# Patient Record
Sex: MALE | Race: BLACK OR AFRICAN AMERICAN
[De-identification: names, ages, dates, MRNs, and addresses within clinical notes are randomized per-mention and may not be internally consistent; named-entity substitution may affect disease eponyms.]

---

## 2019-08-11 ENCOUNTER — HOSPITAL ENCOUNTER (EMERGENCY)
Dept: HOSPITAL 15 - ER | Age: 29
LOS: 1 days | Discharge: HOME | End: 2019-08-12
Payer: MEDICAID

## 2019-08-11 VITALS — BODY MASS INDEX: 29.49 KG/M2 | HEIGHT: 70 IN | WEIGHT: 206 LBS

## 2019-08-11 DIAGNOSIS — S61.411A: Primary | ICD-10-CM
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
## 2019-08-11 DIAGNOSIS — K21.9: ICD-10-CM

## 2019-08-11 DIAGNOSIS — S71.132A: ICD-10-CM
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
## 2019-08-11 PROCEDURE — 96372 THER/PROPH/DIAG INJ SC/IM: CPT

## 2019-08-11 PROCEDURE — 73130 X-RAY EXAM OF HAND: CPT

## 2019-08-11 PROCEDURE — 90715 TDAP VACCINE 7 YRS/> IM: CPT

## 2019-08-11 PROCEDURE — 99284 EMERGENCY DEPT VISIT MOD MDM: CPT

## 2019-08-11 PROCEDURE — 12002 RPR S/N/AX/GEN/TRNK2.6-7.5CM: CPT

## 2019-08-11 PROCEDURE — 90471 IMMUNIZATION ADMIN: CPT

## 2019-08-12 VITALS — SYSTOLIC BLOOD PRESSURE: 135 MMHG | DIASTOLIC BLOOD PRESSURE: 97 MMHG

## 2019-08-18 ENCOUNTER — HOSPITAL ENCOUNTER (EMERGENCY)
Dept: HOSPITAL 15 - ER | Age: 29
Discharge: HOME | End: 2019-08-18
Payer: MEDICAID

## 2019-08-18 VITALS — SYSTOLIC BLOOD PRESSURE: 139 MMHG | DIASTOLIC BLOOD PRESSURE: 82 MMHG

## 2019-08-18 VITALS — HEIGHT: 71 IN | WEIGHT: 207 LBS | BODY MASS INDEX: 28.98 KG/M2
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
## 2019-08-18 DIAGNOSIS — S61.411D: Primary | ICD-10-CM

## 2019-08-18 DIAGNOSIS — K21.9: ICD-10-CM

## 2019-09-02 ENCOUNTER — HOSPITAL ENCOUNTER (INPATIENT)
Dept: HOSPITAL 15 - ER | Age: 29
LOS: 3 days | Discharge: HOME | DRG: 248 | End: 2019-09-05
Attending: INTERNAL MEDICINE | Admitting: NURSE PRACTITIONER
Payer: MEDICAID

## 2019-09-02 VITALS — WEIGHT: 204.37 LBS | BODY MASS INDEX: 28.61 KG/M2 | HEIGHT: 71 IN

## 2019-09-02 DIAGNOSIS — K21.9: ICD-10-CM

## 2019-09-02 DIAGNOSIS — M54.5: ICD-10-CM

## 2019-09-02 DIAGNOSIS — A04.72: Primary | ICD-10-CM

## 2019-09-02 DIAGNOSIS — R50.9: ICD-10-CM

## 2019-09-02 PROCEDURE — 80053 COMPREHEN METABOLIC PANEL: CPT

## 2019-09-02 PROCEDURE — 87493 C DIFF AMPLIFIED PROBE: CPT

## 2019-09-02 PROCEDURE — 87045 FECES CULTURE AEROBIC BACT: CPT

## 2019-09-02 PROCEDURE — 83615 LACTATE (LD) (LDH) ENZYME: CPT

## 2019-09-02 PROCEDURE — 81001 URINALYSIS AUTO W/SCOPE: CPT

## 2019-09-02 PROCEDURE — 74176 CT ABD & PELVIS W/O CONTRAST: CPT

## 2019-09-02 PROCEDURE — 85007 BL SMEAR W/DIFF WBC COUNT: CPT

## 2019-09-02 PROCEDURE — 83690 ASSAY OF LIPASE: CPT

## 2019-09-02 PROCEDURE — 84484 ASSAY OF TROPONIN QUANT: CPT

## 2019-09-02 PROCEDURE — 36415 COLL VENOUS BLD VENIPUNCTURE: CPT

## 2019-09-02 PROCEDURE — 82150 ASSAY OF AMYLASE: CPT

## 2019-09-02 PROCEDURE — 83605 ASSAY OF LACTIC ACID: CPT

## 2019-09-02 PROCEDURE — 85027 COMPLETE CBC AUTOMATED: CPT

## 2019-09-02 PROCEDURE — 87899 AGENT NOS ASSAY W/OPTIC: CPT

## 2019-09-02 PROCEDURE — 80048 BASIC METABOLIC PNL TOTAL CA: CPT

## 2019-09-03 VITALS — DIASTOLIC BLOOD PRESSURE: 78 MMHG | SYSTOLIC BLOOD PRESSURE: 124 MMHG

## 2019-09-03 VITALS — DIASTOLIC BLOOD PRESSURE: 88 MMHG | SYSTOLIC BLOOD PRESSURE: 128 MMHG

## 2019-09-03 VITALS — SYSTOLIC BLOOD PRESSURE: 128 MMHG | DIASTOLIC BLOOD PRESSURE: 88 MMHG

## 2019-09-03 VITALS — DIASTOLIC BLOOD PRESSURE: 78 MMHG | SYSTOLIC BLOOD PRESSURE: 128 MMHG

## 2019-09-03 VITALS — SYSTOLIC BLOOD PRESSURE: 126 MMHG | DIASTOLIC BLOOD PRESSURE: 67 MMHG

## 2019-09-03 LAB
ALBUMIN SERPL-MCNC: 3.6 G/DL (ref 3.4–5)
ALP SERPL-CCNC: 81 U/L (ref 45–117)
ALT SERPL-CCNC: 19 U/L (ref 16–61)
AMYLASE SERPL-CCNC: 41 U/L (ref 25–115)
ANION GAP SERPL CALCULATED.3IONS-SCNC: 8 MMOL/L (ref 5–15)
BILIRUB SERPL-MCNC: 0.3 MG/DL (ref 0.2–1)
BUN SERPL-MCNC: 9 MG/DL (ref 7–18)
BUN/CREAT SERPL: 8.3
CALCIUM SERPL-MCNC: 9 MG/DL (ref 8.5–10.1)
CHLORIDE SERPL-SCNC: 101 MMOL/L (ref 98–107)
CO2 SERPL-SCNC: 29 MMOL/L (ref 21–32)
GLUCOSE SERPL-MCNC: 105 MG/DL (ref 74–106)
HCT VFR BLD AUTO: 48.6 % (ref 41–53)
HGB BLD-MCNC: 16.9 G/DL (ref 13.5–17.5)
LIPASE SERPL-CCNC: 85 U/L (ref 73–393)
MCH RBC QN AUTO: 30.5 PG (ref 28–32)
MCV RBC AUTO: 87.8 FL (ref 80–100)
POTASSIUM SERPL-SCNC: 4 MMOL/L (ref 3.5–5.1)
PROT SERPL-MCNC: 8.1 G/DL (ref 6.4–8.2)
SODIUM SERPL-SCNC: 138 MMOL/L (ref 136–145)

## 2019-09-03 RX ADMIN — FAMOTIDINE SCH MG: 20 TABLET, FILM COATED ORAL at 10:11

## 2019-09-03 RX ADMIN — HYDROCODONE BITARTRATE AND ACETAMINOPHEN PRN TAB: 5; 325 TABLET ORAL at 08:38

## 2019-09-03 RX ADMIN — HYDROCODONE BITARTRATE AND ACETAMINOPHEN PRN TAB: 5; 325 TABLET ORAL at 15:34

## 2019-09-03 RX ADMIN — FAMOTIDINE SCH MG: 20 TABLET, FILM COATED ORAL at 21:10

## 2019-09-03 NOTE — NUR
MS admit from ER

MARQUIS GUTIERREZ admitted to tele/MS after SBAR received.  Patient oriented to TONE MORA, primary RN, unit, room, bed, and unit policies regarding patient care and 
visiting hours. Patient weighed by bed scale and encouraged to call if they need something. 
All questions and concerns addressed, patient verbalized understanding.

## 2019-09-04 VITALS — SYSTOLIC BLOOD PRESSURE: 117 MMHG | DIASTOLIC BLOOD PRESSURE: 78 MMHG

## 2019-09-04 VITALS — DIASTOLIC BLOOD PRESSURE: 90 MMHG | SYSTOLIC BLOOD PRESSURE: 133 MMHG

## 2019-09-04 VITALS — SYSTOLIC BLOOD PRESSURE: 126 MMHG | DIASTOLIC BLOOD PRESSURE: 84 MMHG

## 2019-09-04 VITALS — DIASTOLIC BLOOD PRESSURE: 98 MMHG | SYSTOLIC BLOOD PRESSURE: 143 MMHG

## 2019-09-04 VITALS — DIASTOLIC BLOOD PRESSURE: 82 MMHG | SYSTOLIC BLOOD PRESSURE: 136 MMHG

## 2019-09-04 LAB
ANION GAP SERPL CALCULATED.3IONS-SCNC: 7 MMOL/L (ref 5–15)
BUN SERPL-MCNC: 5 MG/DL (ref 7–18)
BUN/CREAT SERPL: 5.4
CALCIUM SERPL-MCNC: 8.7 MG/DL (ref 8.5–10.1)
CHLORIDE SERPL-SCNC: 103 MMOL/L (ref 98–107)
CO2 SERPL-SCNC: 28 MMOL/L (ref 21–32)
GLUCOSE SERPL-MCNC: 96 MG/DL (ref 74–106)
HCT VFR BLD AUTO: 45.6 % (ref 41–53)
HGB BLD-MCNC: 15.7 G/DL (ref 13.5–17.5)
MCH RBC QN AUTO: 30.3 PG (ref 28–32)
MCV RBC AUTO: 87.7 FL (ref 80–100)
POTASSIUM SERPL-SCNC: 3.7 MMOL/L (ref 3.5–5.1)
SODIUM SERPL-SCNC: 138 MMOL/L (ref 136–145)

## 2019-09-04 RX ADMIN — VANCOMYCIN HYDROCHLORIDE SCH MG: 1 INJECTION, POWDER, LYOPHILIZED, FOR SOLUTION INTRAVENOUS at 21:30

## 2019-09-04 RX ADMIN — HYDROCODONE BITARTRATE AND ACETAMINOPHEN PRN TAB: 5; 325 TABLET ORAL at 21:54

## 2019-09-04 RX ADMIN — FAMOTIDINE SCH MG: 20 TABLET, FILM COATED ORAL at 09:13

## 2019-09-04 RX ADMIN — VANCOMYCIN HYDROCHLORIDE SCH MG: 1 INJECTION, POWDER, LYOPHILIZED, FOR SOLUTION INTRAVENOUS at 17:51

## 2019-09-04 RX ADMIN — HYDROCODONE BITARTRATE AND ACETAMINOPHEN PRN TAB: 5; 325 TABLET ORAL at 12:03

## 2019-09-04 RX ADMIN — HYDROCODONE BITARTRATE AND ACETAMINOPHEN PRN TAB: 5; 325 TABLET ORAL at 01:17

## 2019-09-04 NOTE — NUR
Dr. Reynaga at bedside discuss with patient and made aware of c-diff positive, received new 
orders, noted and carried it out.

## 2019-09-05 VITALS — DIASTOLIC BLOOD PRESSURE: 71 MMHG | SYSTOLIC BLOOD PRESSURE: 117 MMHG

## 2019-09-05 VITALS — SYSTOLIC BLOOD PRESSURE: 134 MMHG | DIASTOLIC BLOOD PRESSURE: 92 MMHG

## 2019-09-05 VITALS — DIASTOLIC BLOOD PRESSURE: 61 MMHG | SYSTOLIC BLOOD PRESSURE: 127 MMHG

## 2019-09-05 RX ADMIN — HYDROCODONE BITARTRATE AND ACETAMINOPHEN PRN TAB: 5; 325 TABLET ORAL at 03:18

## 2019-09-05 RX ADMIN — HYDROCODONE BITARTRATE AND ACETAMINOPHEN PRN TAB: 5; 325 TABLET ORAL at 07:29

## 2019-09-05 RX ADMIN — VANCOMYCIN HYDROCHLORIDE SCH MG: 1 INJECTION, POWDER, LYOPHILIZED, FOR SOLUTION INTRAVENOUS at 11:47

## 2019-09-05 RX ADMIN — VANCOMYCIN HYDROCHLORIDE SCH MG: 1 INJECTION, POWDER, LYOPHILIZED, FOR SOLUTION INTRAVENOUS at 05:17

## 2019-09-05 NOTE — NUR
Opening Shift Note

Assumed care of patient, awake and alert, oriented x4 and verbally responsive.  Respiratory 
even and unlabored. No S/S of distress/SOB or pain.  Skin is warm and dry to touch. 
Instructed on POC and to call for assist PRN, will continue to monitor for changes Q1hr and 
PRN.

## 2019-09-05 NOTE — NUR
Discharge instructions given as ordered. Encourage to follow up with PMD (Follow up with PCP 
in 1-2 weeks Dr. maribel Leong

on Monday Sep 9 at 1.15 PM #[phone] Address : [address] [place] [id number]. Follow up 
with Dr. SAWYER () in 1-2 weeks Address : [id number] [place] [address] Rd, [place], [address] #[phone] 
Ext : [phone]) as instructed. All questions and concerns addressed. Patient verbalized 
understanding.  Medication reconciliation form completed and copy given to patient. IV 
removed with catheter intact, pressure dressing applied.  Patient taken to vehicle via 
wheelchair with all personal belongings, accompanied by staff and family member. No distress 
noted at time of departure.

## 2022-04-29 ENCOUNTER — HOSPITAL ENCOUNTER (EMERGENCY)
Dept: HOSPITAL 15 - ER | Age: 32
Discharge: HOME | End: 2022-04-29
Payer: MEDICAID

## 2022-04-29 VITALS — HEIGHT: 72 IN | BODY MASS INDEX: 29.12 KG/M2 | WEIGHT: 215 LBS

## 2022-04-29 VITALS — SYSTOLIC BLOOD PRESSURE: 139 MMHG | DIASTOLIC BLOOD PRESSURE: 93 MMHG

## 2022-04-29 DIAGNOSIS — Z79.899: ICD-10-CM

## 2022-04-29 DIAGNOSIS — K21.9: ICD-10-CM

## 2022-04-29 DIAGNOSIS — K60.2: Primary | ICD-10-CM

## 2022-04-29 DIAGNOSIS — Z79.2: ICD-10-CM

## 2022-05-10 ENCOUNTER — HOSPITAL ENCOUNTER (EMERGENCY)
Dept: HOSPITAL 15 - ER | Age: 32
LOS: 1 days | Discharge: HOME | End: 2022-05-11
Payer: MEDICAID

## 2022-05-10 VITALS — WEIGHT: 204 LBS | HEIGHT: 72 IN | BODY MASS INDEX: 27.63 KG/M2

## 2022-05-10 DIAGNOSIS — E86.0: ICD-10-CM

## 2022-05-10 DIAGNOSIS — R19.7: ICD-10-CM

## 2022-05-10 DIAGNOSIS — R07.89: Primary | ICD-10-CM

## 2022-05-10 DIAGNOSIS — K21.9: ICD-10-CM

## 2022-05-10 LAB
ALBUMIN SERPL-MCNC: 2.7 G/DL (ref 3.4–5)
ALP SERPL-CCNC: 69 U/L (ref 45–117)
ALT SERPL-CCNC: 15 U/L (ref 16–61)
ANION GAP SERPL CALCULATED.3IONS-SCNC: 11 MMOL/L (ref 5–15)
BILIRUB SERPL-MCNC: 0.4 MG/DL (ref 0.2–1)
BUN SERPL-MCNC: 12 MG/DL (ref 7–18)
BUN/CREAT SERPL: 12.4
CALCIUM SERPL-MCNC: 8.3 MG/DL (ref 8.5–10.1)
CHLORIDE SERPL-SCNC: 93 MMOL/L (ref 98–107)
CO2 SERPL-SCNC: 28 MMOL/L (ref 21–32)
GLUCOSE SERPL-MCNC: 116 MG/DL (ref 74–106)
HCT VFR BLD AUTO: 43.2 % (ref 41–53)
HGB BLD-MCNC: 15.3 G/DL (ref 13.5–17.5)
MCH RBC QN AUTO: 30.3 PG (ref 28–32)
MCV RBC AUTO: 85.5 FL (ref 80–100)
POTASSIUM SERPL-SCNC: 4.1 MMOL/L (ref 3.5–5.1)
PROT SERPL-MCNC: 6.6 G/DL (ref 6.4–8.2)
SODIUM SERPL-SCNC: 132 MMOL/L (ref 136–145)

## 2022-05-10 PROCEDURE — 85007 BL SMEAR W/DIFF WBC COUNT: CPT

## 2022-05-10 PROCEDURE — 80053 COMPREHEN METABOLIC PANEL: CPT

## 2022-05-10 PROCEDURE — 85027 COMPLETE CBC AUTOMATED: CPT

## 2022-05-10 PROCEDURE — 36415 COLL VENOUS BLD VENIPUNCTURE: CPT

## 2022-05-10 PROCEDURE — 96374 THER/PROPH/DIAG INJ IV PUSH: CPT

## 2022-05-10 PROCEDURE — 93005 ELECTROCARDIOGRAM TRACING: CPT

## 2022-05-10 PROCEDURE — 71045 X-RAY EXAM CHEST 1 VIEW: CPT

## 2022-05-10 PROCEDURE — 96361 HYDRATE IV INFUSION ADD-ON: CPT

## 2022-05-10 PROCEDURE — 84484 ASSAY OF TROPONIN QUANT: CPT

## 2022-05-10 PROCEDURE — 71275 CT ANGIOGRAPHY CHEST: CPT

## 2022-05-10 PROCEDURE — 99285 EMERGENCY DEPT VISIT HI MDM: CPT

## 2022-05-11 VITALS — DIASTOLIC BLOOD PRESSURE: 81 MMHG | SYSTOLIC BLOOD PRESSURE: 130 MMHG

## 2022-05-11 LAB — NRBC BLD QL AUTO: 2 %
